# Patient Record
Sex: MALE | Race: WHITE
[De-identification: names, ages, dates, MRNs, and addresses within clinical notes are randomized per-mention and may not be internally consistent; named-entity substitution may affect disease eponyms.]

---

## 2018-04-03 ENCOUNTER — HOSPITAL ENCOUNTER (OUTPATIENT)
Dept: HOSPITAL 95 - ORSCSDS | Age: 78
Discharge: HOME | End: 2018-04-03
Attending: INTERNAL MEDICINE
Payer: MEDICARE

## 2018-04-03 VITALS — HEIGHT: 70 IN | BODY MASS INDEX: 28.77 KG/M2 | WEIGHT: 201 LBS

## 2018-04-03 DIAGNOSIS — D12.0: ICD-10-CM

## 2018-04-03 DIAGNOSIS — Z79.899: ICD-10-CM

## 2018-04-03 DIAGNOSIS — I10: ICD-10-CM

## 2018-04-03 DIAGNOSIS — Z86.010: ICD-10-CM

## 2018-04-03 DIAGNOSIS — K57.30: ICD-10-CM

## 2018-04-03 DIAGNOSIS — K64.8: ICD-10-CM

## 2018-04-03 DIAGNOSIS — D12.3: ICD-10-CM

## 2018-04-03 DIAGNOSIS — Z12.11: Primary | ICD-10-CM

## 2018-04-03 PROCEDURE — 0DBH8ZX EXCISION OF CECUM, VIA NATURAL OR ARTIFICIAL OPENING ENDOSCOPIC, DIAGNOSTIC: ICD-10-PCS | Performed by: INTERNAL MEDICINE

## 2018-04-03 PROCEDURE — 0DBL8ZX EXCISION OF TRANSVERSE COLON, VIA NATURAL OR ARTIFICIAL OPENING ENDOSCOPIC, DIAGNOSTIC: ICD-10-PCS | Performed by: INTERNAL MEDICINE

## 2020-01-15 ENCOUNTER — HOSPITAL ENCOUNTER (OUTPATIENT)
Dept: HOSPITAL 95 - ORSCSDS | Age: 80
Discharge: HOME | End: 2020-01-15
Attending: ANESTHESIOLOGY
Payer: MEDICARE

## 2020-01-15 VITALS — WEIGHT: 215.83 LBS | BODY MASS INDEX: 30.9 KG/M2 | HEIGHT: 70 IN

## 2020-01-15 DIAGNOSIS — M54.16: Primary | ICD-10-CM

## 2020-01-15 DIAGNOSIS — E66.9: ICD-10-CM

## 2020-01-15 DIAGNOSIS — M48.061: ICD-10-CM

## 2020-01-15 DIAGNOSIS — Z79.899: ICD-10-CM

## 2020-01-15 DIAGNOSIS — Z79.82: ICD-10-CM

## 2020-01-15 DIAGNOSIS — I10: ICD-10-CM

## 2020-01-15 DIAGNOSIS — E03.9: ICD-10-CM

## 2020-01-15 DIAGNOSIS — F32.9: ICD-10-CM

## 2020-01-15 PROCEDURE — 3E0R33Z INTRODUCTION OF ANTI-INFLAMMATORY INTO SPINAL CANAL, PERCUTANEOUS APPROACH: ICD-10-PCS | Performed by: ANESTHESIOLOGY

## 2020-06-11 ENCOUNTER — HOSPITAL ENCOUNTER (OUTPATIENT)
Dept: HOSPITAL 95 - ORSCSDS | Age: 80
Discharge: HOME | End: 2020-06-11
Attending: ANESTHESIOLOGY
Payer: MEDICARE

## 2020-06-11 VITALS — WEIGHT: 216.27 LBS | BODY MASS INDEX: 32.03 KG/M2 | HEIGHT: 69.02 IN

## 2020-06-11 DIAGNOSIS — E03.9: ICD-10-CM

## 2020-06-11 DIAGNOSIS — I10: ICD-10-CM

## 2020-06-11 DIAGNOSIS — F32.9: ICD-10-CM

## 2020-06-11 DIAGNOSIS — M51.16: Primary | ICD-10-CM

## 2020-06-11 DIAGNOSIS — Z79.82: ICD-10-CM

## 2020-06-11 DIAGNOSIS — Z79.899: ICD-10-CM

## 2020-06-11 PROCEDURE — 3E0R33Z INTRODUCTION OF ANTI-INFLAMMATORY INTO SPINAL CANAL, PERCUTANEOUS APPROACH: ICD-10-PCS | Performed by: ANESTHESIOLOGY

## 2020-10-28 ENCOUNTER — HOSPITAL ENCOUNTER (OUTPATIENT)
Dept: HOSPITAL 95 - ORSCSDS | Age: 80
Discharge: HOME | End: 2020-10-28
Attending: ANESTHESIOLOGY
Payer: MEDICARE

## 2020-10-28 VITALS — WEIGHT: 217.16 LBS | HEIGHT: 70 IN | BODY MASS INDEX: 31.09 KG/M2

## 2020-10-28 DIAGNOSIS — M51.16: Primary | ICD-10-CM

## 2020-10-28 DIAGNOSIS — Z79.899: ICD-10-CM

## 2020-10-28 DIAGNOSIS — I10: ICD-10-CM

## 2020-10-28 DIAGNOSIS — Z79.82: ICD-10-CM

## 2020-10-28 DIAGNOSIS — E66.01: ICD-10-CM

## 2020-10-28 DIAGNOSIS — E03.9: ICD-10-CM

## 2020-10-28 DIAGNOSIS — F32.9: ICD-10-CM

## 2020-10-28 PROCEDURE — 3E0R33Z INTRODUCTION OF ANTI-INFLAMMATORY INTO SPINAL CANAL, PERCUTANEOUS APPROACH: ICD-10-PCS | Performed by: ANESTHESIOLOGY

## 2021-08-17 ENCOUNTER — HOSPITAL ENCOUNTER (OUTPATIENT)
Dept: HOSPITAL 95 - ORSCSDS | Age: 81
Discharge: HOME | End: 2021-08-17
Attending: INTERNAL MEDICINE
Payer: MEDICARE

## 2021-08-17 VITALS — WEIGHT: 197.75 LBS | HEIGHT: 70 IN | BODY MASS INDEX: 28.31 KG/M2

## 2021-08-17 DIAGNOSIS — Z79.899: ICD-10-CM

## 2021-08-17 DIAGNOSIS — Z79.82: ICD-10-CM

## 2021-08-17 DIAGNOSIS — Z12.11: Primary | ICD-10-CM

## 2021-08-17 DIAGNOSIS — D12.3: ICD-10-CM

## 2021-08-17 DIAGNOSIS — I10: ICD-10-CM

## 2021-08-17 DIAGNOSIS — I48.91: ICD-10-CM

## 2021-08-17 DIAGNOSIS — Z86.010: ICD-10-CM

## 2021-08-17 DIAGNOSIS — K57.30: ICD-10-CM

## 2021-08-17 DIAGNOSIS — K64.8: ICD-10-CM

## 2021-08-17 DIAGNOSIS — Z79.01: ICD-10-CM

## 2021-08-17 PROCEDURE — 0DBL8ZX EXCISION OF TRANSVERSE COLON, VIA NATURAL OR ARTIFICIAL OPENING ENDOSCOPIC, DIAGNOSTIC: ICD-10-PCS | Performed by: INTERNAL MEDICINE

## 2022-08-16 ENCOUNTER — HOSPITAL ENCOUNTER (OUTPATIENT)
Dept: HOSPITAL 95 - MHTC | Age: 82
Discharge: HOME | End: 2022-08-16
Attending: INTERNAL MEDICINE
Payer: MEDICARE

## 2022-08-16 VITALS — WEIGHT: 218.26 LBS | HEIGHT: 69 IN | BODY MASS INDEX: 32.33 KG/M2

## 2022-08-16 DIAGNOSIS — I47.1: ICD-10-CM

## 2022-08-16 DIAGNOSIS — Z79.01: ICD-10-CM

## 2022-08-16 DIAGNOSIS — I11.9: ICD-10-CM

## 2022-08-16 DIAGNOSIS — Z86.73: ICD-10-CM

## 2022-08-16 DIAGNOSIS — I48.0: Primary | ICD-10-CM

## 2022-08-16 DIAGNOSIS — R00.1: ICD-10-CM

## 2022-08-16 DIAGNOSIS — I25.82: ICD-10-CM

## 2022-08-16 DIAGNOSIS — I25.10: ICD-10-CM

## 2022-08-16 PROCEDURE — C1894 INTRO/SHEATH, NON-LASER: HCPCS

## 2022-08-16 PROCEDURE — A9270 NON-COVERED ITEM OR SERVICE: HCPCS

## 2022-08-16 PROCEDURE — C1769 GUIDE WIRE: HCPCS

## 2022-08-16 PROCEDURE — C1887 CATHETER, GUIDING: HCPCS

## 2022-08-16 NOTE — NUR
PT GIVEN DC INSTRUCTIONS AND VERBALIZED UNDERSTANDING. TR BAND REMOVED. CLOTH
DOT PLACED. ARM BOARD PLACED. SLING PLACED. PT TAKEN TO LBY VIA WC WHERE
FAMILY IS WAITING. PT TAKEN TO VEHICLE VIA WC.

## 2023-06-03 ENCOUNTER — HOSPITAL ENCOUNTER (OUTPATIENT)
Dept: HOSPITAL 95 - LAB | Age: 83
Discharge: HOME | End: 2023-06-03
Attending: NURSE PRACTITIONER
Payer: MEDICARE

## 2023-06-03 DIAGNOSIS — R31.9: Primary | ICD-10-CM

## 2023-07-20 ENCOUNTER — HOSPITAL ENCOUNTER (OUTPATIENT)
Dept: HOSPITAL 95 - MHTC | Age: 83
Discharge: HOME | End: 2023-07-20
Attending: INTERNAL MEDICINE
Payer: MEDICARE

## 2023-07-20 VITALS — DIASTOLIC BLOOD PRESSURE: 87 MMHG | SYSTOLIC BLOOD PRESSURE: 145 MMHG

## 2023-07-20 VITALS — SYSTOLIC BLOOD PRESSURE: 136 MMHG | DIASTOLIC BLOOD PRESSURE: 69 MMHG

## 2023-07-20 VITALS — SYSTOLIC BLOOD PRESSURE: 162 MMHG | DIASTOLIC BLOOD PRESSURE: 102 MMHG

## 2023-07-20 VITALS — BODY MASS INDEX: 27.77 KG/M2 | HEIGHT: 70 IN | WEIGHT: 194.01 LBS

## 2023-07-20 VITALS — DIASTOLIC BLOOD PRESSURE: 71 MMHG | SYSTOLIC BLOOD PRESSURE: 124 MMHG

## 2023-07-20 VITALS — DIASTOLIC BLOOD PRESSURE: 71 MMHG | SYSTOLIC BLOOD PRESSURE: 144 MMHG

## 2023-07-20 VITALS — SYSTOLIC BLOOD PRESSURE: 130 MMHG | DIASTOLIC BLOOD PRESSURE: 89 MMHG

## 2023-07-20 DIAGNOSIS — I48.0: Primary | ICD-10-CM

## 2023-07-20 DIAGNOSIS — I25.10: ICD-10-CM

## 2023-07-20 DIAGNOSIS — I10: ICD-10-CM

## 2023-07-20 PROCEDURE — A9270 NON-COVERED ITEM OR SERVICE: HCPCS

## 2023-07-20 NOTE — NUR
PT AND WIFE VERBALIZED UNDERSTANDING OF WRITTEN AND VERBAL D/C INST. IV
REMOVED. PT TAKEN OUT OF THE HRT CENTER VIA W/C.

## 2023-10-03 ENCOUNTER — HOSPITAL ENCOUNTER (OUTPATIENT)
Dept: HOSPITAL 95 - LAB SHORT | Age: 83
End: 2023-10-03
Attending: INTERNAL MEDICINE
Payer: MEDICARE

## 2023-10-03 DIAGNOSIS — D64.9: Primary | ICD-10-CM

## 2023-10-03 DIAGNOSIS — R94.5: ICD-10-CM

## 2023-10-03 LAB
BASOPHILS # BLD AUTO: 0.06 K/MM3 (ref 0–0.23)
BASOPHILS NFR BLD AUTO: 1 % (ref 0–2)
DEPRECATED RDW RBC AUTO: 53.3 FL (ref 35.1–46.3)
EOSINOPHIL # BLD AUTO: 0.08 K/MM3 (ref 0–0.68)
EOSINOPHIL NFR BLD AUTO: 1 % (ref 0–6)
ERYTHROCYTE [DISTWIDTH] IN BLOOD BY AUTOMATED COUNT: 14.9 % (ref 11.7–14.2)
HCT VFR BLD AUTO: 33.5 % (ref 37–53)
HGB BLD-MCNC: 10.8 G/DL (ref 13.5–17.5)
IMM GRANULOCYTES # BLD AUTO: 0.08 K/MM3 (ref 0–0.1)
IMM GRANULOCYTES NFR BLD AUTO: 1 % (ref 0–1)
LYMPHOCYTES # BLD AUTO: 1.03 K/MM3 (ref 0.84–5.2)
LYMPHOCYTES NFR BLD AUTO: 18 % (ref 21–46)
MCHC RBC AUTO-ENTMCNC: 32.2 G/DL (ref 31.5–36.5)
MCV RBC AUTO: 97 FL (ref 80–100)
MONOCYTES # BLD AUTO: 0.78 K/MM3 (ref 0.16–1.47)
MONOCYTES NFR BLD AUTO: 14 % (ref 4–13)
NEUTROPHILS # BLD AUTO: 3.74 K/MM3 (ref 1.96–9.15)
NEUTROPHILS NFR BLD AUTO: 65 % (ref 41–73)
NRBC # BLD AUTO: 0 K/MM3 (ref 0–0.02)
NRBC BLD AUTO-RTO: 0 /100 WBC (ref 0–0.2)
PLATELET # BLD AUTO: 193 K/MM3 (ref 150–400)

## 2024-11-05 ENCOUNTER — HOSPITAL ENCOUNTER (OUTPATIENT)
Dept: HOSPITAL 95 - ORSCSDS | Age: 84
Discharge: HOME | End: 2024-11-05
Attending: STUDENT IN AN ORGANIZED HEALTH CARE EDUCATION/TRAINING PROGRAM
Payer: MEDICARE

## 2024-11-05 VITALS — HEIGHT: 69 IN | BODY MASS INDEX: 30.33 KG/M2 | WEIGHT: 204.81 LBS

## 2024-11-05 VITALS — SYSTOLIC BLOOD PRESSURE: 135 MMHG | DIASTOLIC BLOOD PRESSURE: 90 MMHG

## 2024-11-05 DIAGNOSIS — Z79.899: ICD-10-CM

## 2024-11-05 DIAGNOSIS — H21.81: ICD-10-CM

## 2024-11-05 DIAGNOSIS — I48.91: ICD-10-CM

## 2024-11-05 DIAGNOSIS — H52.201: ICD-10-CM

## 2024-11-05 DIAGNOSIS — H25.813: Primary | ICD-10-CM

## 2024-11-05 PROCEDURE — V2632 POST CHMBR INTRAOCULAR LENS: HCPCS

## 2024-11-05 PROCEDURE — 08RJ3JZ REPLACEMENT OF RIGHT LENS WITH SYNTHETIC SUBSTITUTE, PERCUTANEOUS APPROACH: ICD-10-PCS | Performed by: STUDENT IN AN ORGANIZED HEALTH CARE EDUCATION/TRAINING PROGRAM

## 2024-11-12 ENCOUNTER — HOSPITAL ENCOUNTER (OUTPATIENT)
Dept: HOSPITAL 95 - ORSCSDS | Age: 84
Discharge: HOME | End: 2024-11-12
Attending: STUDENT IN AN ORGANIZED HEALTH CARE EDUCATION/TRAINING PROGRAM
Payer: MEDICARE

## 2024-11-12 VITALS — SYSTOLIC BLOOD PRESSURE: 119 MMHG | DIASTOLIC BLOOD PRESSURE: 75 MMHG

## 2024-11-12 VITALS — HEIGHT: 69 IN | WEIGHT: 206.79 LBS | BODY MASS INDEX: 30.63 KG/M2

## 2024-11-12 DIAGNOSIS — H21.81: ICD-10-CM

## 2024-11-12 DIAGNOSIS — K21.9: ICD-10-CM

## 2024-11-12 DIAGNOSIS — Z79.899: ICD-10-CM

## 2024-11-12 DIAGNOSIS — Z96.1: ICD-10-CM

## 2024-11-12 DIAGNOSIS — H52.202: ICD-10-CM

## 2024-11-12 DIAGNOSIS — I10: ICD-10-CM

## 2024-11-12 DIAGNOSIS — H25.812: Primary | ICD-10-CM

## 2024-11-12 DIAGNOSIS — I48.91: ICD-10-CM

## 2024-11-12 DIAGNOSIS — E66.9: ICD-10-CM

## 2024-11-12 PROCEDURE — V2632 POST CHMBR INTRAOCULAR LENS: HCPCS

## 2024-11-12 PROCEDURE — 08RK3JZ REPLACEMENT OF LEFT LENS WITH SYNTHETIC SUBSTITUTE, PERCUTANEOUS APPROACH: ICD-10-PCS | Performed by: STUDENT IN AN ORGANIZED HEALTH CARE EDUCATION/TRAINING PROGRAM

## 2024-11-25 ENCOUNTER — HOSPITAL ENCOUNTER (OUTPATIENT)
Dept: HOSPITAL 95 - ORSCMMR | Age: 84
Discharge: HOME | End: 2024-11-25
Payer: MEDICARE

## 2024-11-25 VITALS — DIASTOLIC BLOOD PRESSURE: 95 MMHG | SYSTOLIC BLOOD PRESSURE: 147 MMHG

## 2024-11-25 VITALS — DIASTOLIC BLOOD PRESSURE: 52 MMHG | SYSTOLIC BLOOD PRESSURE: 125 MMHG

## 2024-11-25 VITALS — BODY MASS INDEX: 31.71 KG/M2 | WEIGHT: 209.24 LBS | HEIGHT: 68 IN

## 2024-11-25 VITALS — SYSTOLIC BLOOD PRESSURE: 129 MMHG | DIASTOLIC BLOOD PRESSURE: 48 MMHG

## 2024-11-25 DIAGNOSIS — M48.062: ICD-10-CM

## 2024-11-25 DIAGNOSIS — Z87.891: ICD-10-CM

## 2024-11-25 DIAGNOSIS — M47.26: Primary | ICD-10-CM

## 2024-11-25 DIAGNOSIS — Z79.899: ICD-10-CM
